# Patient Record
Sex: FEMALE | Race: WHITE | NOT HISPANIC OR LATINO | Employment: UNEMPLOYED | URBAN - METROPOLITAN AREA
[De-identification: names, ages, dates, MRNs, and addresses within clinical notes are randomized per-mention and may not be internally consistent; named-entity substitution may affect disease eponyms.]

---

## 2017-03-24 ENCOUNTER — CLINICAL SUPPORT (OUTPATIENT)
Dept: SMOKING CESSATION | Facility: CLINIC | Age: 57
End: 2017-03-24
Payer: COMMERCIAL

## 2017-03-24 DIAGNOSIS — F17.200 NICOTINE DEPENDENCE: Primary | ICD-10-CM

## 2017-03-24 PROCEDURE — 99407 BEHAV CHNG SMOKING > 10 MIN: CPT | Mod: S$GLB,,,

## 2017-09-11 ENCOUNTER — CLINICAL SUPPORT (OUTPATIENT)
Dept: SMOKING CESSATION | Facility: CLINIC | Age: 57
End: 2017-09-11
Payer: COMMERCIAL

## 2017-09-11 DIAGNOSIS — F17.200 NICOTINE DEPENDENCE: Primary | ICD-10-CM

## 2017-09-11 PROCEDURE — 99407 BEHAV CHNG SMOKING > 10 MIN: CPT | Mod: S$GLB,,, | Performed by: INTERNAL MEDICINE

## 2018-02-07 ENCOUNTER — TELEPHONE (OUTPATIENT)
Dept: PHARMACY | Facility: CLINIC | Age: 58
End: 2018-02-07

## 2018-02-07 RX ORDER — ESCITALOPRAM OXALATE 20 MG/1
20 TABLET ORAL NIGHTLY
Qty: 90 TABLET | Refills: 3 | Status: SHIPPED | OUTPATIENT
Start: 2018-02-07 | End: 2018-07-10

## 2018-02-07 NOTE — TELEPHONE ENCOUNTER
Call and relay:  I have sent in a 90 day prescription with refills for escitalopram (Lexapro) 20 mg to be taken every bedtime.

## 2018-02-07 NOTE — TELEPHONE ENCOUNTER
Called and spoke with patient notifying her that I was submitting a PA on her generic Lexapro 10mg to her insurance, because of the quantity.    She was aware that this was going to be needed.    I told her that hopefully it would be approved today, and I would get back with her asap.    PT verbalized understanding.    Please let me know if you any questions.     Thanks,   Nakia Ott  Patient Care Advocate   [facility]  Phone: [phone]  Fax: [phone]

## 2018-02-07 NOTE — TELEPHONE ENCOUNTER
Called and spoke with Ms. Alfaro.    INSURANCE WILL NOT ALLOW PA ON ESCITALOPRAM 10MG FOR 1.5 TABLETS #45 per 30 Day or #135 per 90 days. 2018 PLAN EXCLUDES PA ON THIS OR EXCEPTION OVERRIDE. ONLY ALLOWS QTY OF #30 PER 30 DAYS, OR #90 PER 90 DAYS. PER ALINA @ [facility] [phone] @ 11:25AM 2/7/18.      Even tried to submit claim via Cover My Meds and was unable to send due to drug/quantity not covered.    Explained I would let providers office know that PA could not be completed and that provider would have to consider maybe going up a dose to the 20MG or possible different drug.    Please call new prescription into [facility] (Store [id number]) @ [phone] (ph) or [phone] (fax) listed on patient's profile, she stated this is more convenient for her.    Patient was unsure why Rx called in by Dr. Sheehan's office as she sees Dr. Brooks.  I told her I would CC both of them on the message.    Pt will follow-up with MD on Thursday.    Please let me know if you any questions.     Thanks,   Nakia Ott  Patient Care Advocate   [facility]  Phone: [phone]  Fax: [phone]

## 2021-05-10 ENCOUNTER — PATIENT MESSAGE (OUTPATIENT)
Dept: RESEARCH | Facility: HOSPITAL | Age: 61
End: 2021-05-10

## 2022-01-07 ENCOUNTER — TELEPHONE (OUTPATIENT)
Dept: HEMATOLOGY/ONCOLOGY | Facility: CLINIC | Age: 62
End: 2022-01-07

## 2022-01-07 NOTE — NURSING
Received referral for [facility] from Dr. Sheehan. Appt scheduled for 1/13/21. Message sent to Dr. Gonzalez to determine need for PET scan. SPoke with patient regarding appt and directions to cancer center provided. She verbalized understanding    Oncology Navigation   Intake  Cancer Type: LDCT  Internal / External Referral: Internal  Referral Source: Epic referral from Dr. Sheehan  Date of Referral: 1/6/2022  Initial Nurse Navigator Contact: 1/7/2022  Referral to Initial Contact Timeline (days): 1  Date Worked: 1/7/2022     Treatment                              Acuity      Follow Up  No follow-ups on file.

## 2022-01-12 ENCOUNTER — TELEPHONE (OUTPATIENT)
Dept: HEMATOLOGY/ONCOLOGY | Facility: CLINIC | Age: 62
End: 2022-01-12
Payer: COMMERCIAL

## 2022-01-13 ENCOUNTER — OFFICE VISIT (OUTPATIENT)
Dept: PULMONOLOGY | Facility: CLINIC | Age: 62
End: 2022-01-13
Payer: COMMERCIAL

## 2022-01-13 ENCOUNTER — TELEPHONE (OUTPATIENT)
Dept: HEMATOLOGY/ONCOLOGY | Facility: CLINIC | Age: 62
End: 2022-01-13
Payer: COMMERCIAL

## 2022-01-13 VITALS
HEIGHT: 64 IN | RESPIRATION RATE: 17 BRPM | OXYGEN SATURATION: 98 % | TEMPERATURE: 97 F | WEIGHT: 196.44 LBS | DIASTOLIC BLOOD PRESSURE: 89 MMHG | HEART RATE: 87 BPM | BODY MASS INDEX: 33.54 KG/M2 | SYSTOLIC BLOOD PRESSURE: 134 MMHG

## 2022-01-13 DIAGNOSIS — J44.89 ASTHMA-COPD OVERLAP SYNDROME: ICD-10-CM

## 2022-01-13 DIAGNOSIS — R91.8 LUNG NODULES: ICD-10-CM

## 2022-01-13 DIAGNOSIS — R91.8 ABNORMAL CT LUNG SCREENING: ICD-10-CM

## 2022-01-13 PROCEDURE — 1159F MED LIST DOCD IN RCRD: CPT | Mod: CPTII,S$GLB,, | Performed by: INTERNAL MEDICINE

## 2022-01-13 PROCEDURE — 99204 OFFICE O/P NEW MOD 45 MIN: CPT | Mod: S$GLB,,, | Performed by: INTERNAL MEDICINE

## 2022-01-13 PROCEDURE — 99999 PR PBB SHADOW E&M-EST. PATIENT-LVL IV: ICD-10-PCS | Mod: PBBFAC,,, | Performed by: INTERNAL MEDICINE

## 2022-01-13 PROCEDURE — 3008F PR BODY MASS INDEX (BMI) DOCUMENTED: ICD-10-PCS | Mod: CPTII,S$GLB,, | Performed by: INTERNAL MEDICINE

## 2022-01-13 PROCEDURE — 3075F PR MOST RECENT SYSTOLIC BLOOD PRESS GE 130-139MM HG: ICD-10-PCS | Mod: CPTII,S$GLB,, | Performed by: INTERNAL MEDICINE

## 2022-01-13 PROCEDURE — 99204 PR OFFICE/OUTPT VISIT, NEW, LEVL IV, 45-59 MIN: ICD-10-PCS | Mod: S$GLB,,, | Performed by: INTERNAL MEDICINE

## 2022-01-13 PROCEDURE — 1160F RVW MEDS BY RX/DR IN RCRD: CPT | Mod: CPTII,S$GLB,, | Performed by: INTERNAL MEDICINE

## 2022-01-13 PROCEDURE — 3079F PR MOST RECENT DIASTOLIC BLOOD PRESSURE 80-89 MM HG: ICD-10-PCS | Mod: CPTII,S$GLB,, | Performed by: INTERNAL MEDICINE

## 2022-01-13 PROCEDURE — 1160F PR REVIEW ALL MEDS BY PRESCRIBER/CLIN PHARMACIST DOCUMENTED: ICD-10-PCS | Mod: CPTII,S$GLB,, | Performed by: INTERNAL MEDICINE

## 2022-01-13 PROCEDURE — 99999 PR PBB SHADOW E&M-EST. PATIENT-LVL IV: CPT | Mod: PBBFAC,,, | Performed by: INTERNAL MEDICINE

## 2022-01-13 PROCEDURE — 3079F DIAST BP 80-89 MM HG: CPT | Mod: CPTII,S$GLB,, | Performed by: INTERNAL MEDICINE

## 2022-01-13 PROCEDURE — 3075F SYST BP GE 130 - 139MM HG: CPT | Mod: CPTII,S$GLB,, | Performed by: INTERNAL MEDICINE

## 2022-01-13 PROCEDURE — 3008F BODY MASS INDEX DOCD: CPT | Mod: CPTII,S$GLB,, | Performed by: INTERNAL MEDICINE

## 2022-01-13 PROCEDURE — 1159F PR MEDICATION LIST DOCUMENTED IN MEDICAL RECORD: ICD-10-PCS | Mod: CPTII,S$GLB,, | Performed by: INTERNAL MEDICINE

## 2022-01-13 NOTE — PROGRESS NOTES
[facility]   Initial Office Visit  Chief Complaint   Patient presents with    [facility]     Referred by Dr. Sheehan       SUBJECTIVE:     History of Present Illness:  Patient is a 61 y.o. female presents for evaluation. Pulmonary evaluation for abnormal lung cancer screening referred by Dr Sheehan.    Patient reports chronic cough sputum production.  Sputum is clear    Quit smoking 2016 after 43 years 1 and half packs per day.  Reports that she was tickling when she was ill.  There is a history of eczema in her family.  Patient did have pulmonary function studies requested by Dr. Sheehan that showed moderate airflow obstruction with good response to bronchodilator and then a moderate decrease in the DLCO.  Patient does not have an exercise routine.  [facility] and she was prescribed Symbicort that she has started to use.  She never feels the need to use a rescue inhaler    She is wondering whether she really needs to have a bronchodilator on a chronic basis    Lung cancer screening CT was obtained and showed some abnormalities and that is the reason she is here today  Patient denies chest pain hemoptysis.  She reports occasional back pain.    Exposures: 1.5 PPD x 43 years ; quit in 2016    Smoking Hx:  As above    Past Medical History:   Diagnosis Date    a Palpitations     7/10/18 Referred To Dr. Rojas Streeter; 7/10/18 EKG = (See Report)    c Hypercholesterolemia     8/29/18 RXd Lipitor 20 Mg qHS; 5/28/17 RXd Lifestyle Changes    e H/O Hypothyroidism     She Was On Levothyroxine For This In The 1990s    e Left Subcentimeter Stable Thyroid Nodules     Will Repeat A Thyroid U/S In 2 Years; 5/18/17 Thyroid U/S = (See Report); This Was BXd In 1990 And Was Benign    g Chronic Mild Thrombocytosis     Am Monitoring    i Drowsiness With Snoring     9/28/17 Referred To Dr. Kimber Arevalo    i H/O 1.5 PPD X 43 YRs TUD, Quit In 09/2016     i PPD POS CXR Neg TXd  "In  With INH For 6 Months     j Chronic Diarrhea     17 RXd Flagyl X 10 Days    j Family H/O Colon Polyps     Dr. Farhad mar GERD     Dr. Farhad mar H/O Adenomatous Colon Polyps On 17 TC     Dr. Farhad Marcos: "Repeat TC In 5 YRs"    j Sigmoid Diverticulosis     Dr. Farhad mar Small Internal Hemorrhoids     Dr. Farhad herrera Bilateral Fibrocystic Breast Changes     k H/O Bladder Suspension Sx In      n Depression And Anxiety     10/23/18 I Gave Her A List Of Therapists; 7/10/18 RXd Zoloft 50 Mg qHS But She Never Took It; 17 Increased Lexapro To 15 Mg qHS But This Caused Drowsiness; 17 RXd Lexapro 10 Mg qHS    o Allergic Rhinosinusitis     q Vitamin D deficiency     18 RXd OTC D3 5K IU Daily    Wellness Visit 2017      Past Surgical History:   Procedure Laterality Date    COLONOSCOPY  2017    HYSTERECTOMY      TUBAL LIGATION      Tummy tuck       Family History   Problem Relation Age of Onset    Lung disease Mother     Lung disease Father     Breast cancer Maternal Aunt      Social History     Tobacco Use    Smoking status: Former Smoker     Packs/day: 1.50     Years: 43.00     Pack years: 64.50     Types: Cigarettes     Quit date: 9/15/2016     Years since quittin.3    Smokeless tobacco: Former User    Tobacco comment: 16 Decreased cigarette smoking from 2 PPD to 11 cigarettes per day.   Substance Use Topics    Alcohol use: No    Drug use: No        Review of patient's allergies indicates:   Allergen Reactions    Penicillins Nausea And Vomiting and Rash    Tylenol [acetaminophen] Swelling       Current Outpatient Medications on File Prior to Visit   Medication Sig Dispense Refill    atorvastatin (LIPITOR) 10 MG tablet Take 1 tablet (10 mg total) by mouth once daily. 90 tablet 3    AVASTIN 25 mg/mL injection INJECT 0.05ML TO AFFECTED EYE(S) AS DIRECTED      budesonide-formoterol 160-4.5 " "mcg (SYMBICORT) 160-4.5 mcg/actuation HFAA Inhale 2 puffs into the lungs 2 (two) times daily. Controller 10.2 g 6    cholecalciferol, vitamin D3, 5,000 unit Tab Take 5,000 Units by mouth once daily.      brompheniramine-pseudoeph-DM (BROMFED DM) 2-30-10 mg/5 mL Syrp Take 10 mLs by mouth 4 (four) times daily as needed. 240 mL 0     No current facility-administered medications on file prior to visit.         Review of Systems     Constitutional: Negative unless otherwise commented above  HENT: Negative unless otherwise commented above  Eyes: Negative unless otherwise commented above  Respiratory: Negative unless otherwise commented above  Cardiovascular: Negative unless otherwise commented above  Musculoskeletal: Negative unless otherwise commented above  Skin: Negative unless otherwise commented above  Neurological: Negative unless otherwise commented above  Hematological: Negative unless otherwise commented above  Endocrine: Negative unless otherwise commented above    OBJECTIVE:     Vital Signs (Most Recent)  Visit Vitals  /89 (BP Location: Left arm, Patient Position: Sitting)   Pulse 87   Temp 97.4 °F (36.3 °C) (Temporal)   Resp 17   Ht 5' 4" (1.626 m)   Wt 89.1 kg (196 lb 6.9 oz)   SpO2 98%   BMI 33.72 kg/m²     5' 4" (1.626 m)  89.1 kg (196 lb 6.9 oz)     Physical Exam:    General:  BMI 33 awake alert.  Reports some small nodules in the extremities 1 the right lumbar area collected on small possibly lipomas   Eye: Extraocular movements are intact, Normal conjunctiva.  No scleral icterus  HENT: Normocephalic, Oral mucosa is moist, No pharyngeal erythema, clear oropharynx  Neck: Supple, Non-tender, No jugular venous distention.  Respiratory:  Diminished breath sounds but no adventitious sounds   Cardiovascular:  Regular rate and rhythm no murmur   Extremities:  No cyanosis clubbing or edema  Gastrointestinal: Soft, Non-tender, Non-distended   Lymphatics: No lymphadenopathy neck, supraclavicular. "   Integumentary: Warm, Dry.   Neurologic: Alert, Oriented, Normal motor function, No focal defects.       Diagnostic Results:    PFT Results  Spirometry  FVC Liters 3.03 2.51 83 2.76 91 10  FEV1 Liters 2.45 1.48 60 1.74 71 18% response  DLCO mL/min/mmHg 29.3 16.8 57  No data recorded   No data recorded  No data recorded  No data recorded  No data recorded  No data recorded  No data recorded  No data recorded  No data recorded  No data recorded  No data recorded  SpO2: 98 % (room air)      Significant Imaging:  CXR: Ct chest lung cancer screening with 2 RUL and NEY noduiles 3 mm or so. No mediastinal LN enlargement  RLL smooth paravertebral nodule 9 mm diam abuts the spine     Test(s) Reviewed  I have personally reviewed the following in detail:  [] Chest Xray Images   [x] CT Images   [] Echocardiogram   [] Labs   [x] Other:  pft     Assessment:         ICD-10-CM ICD-9-CM   1. Abnormal CT lung screening  R91.8 793.19   2. Asthma-COPD overlap syndrome  J44.9 493.20   3. Lung nodules  R91.8 793.19   4. BMI 33.0-33.9,adult  Z68.33 V85.33        Plan:   Abnormal CT lung screening  -     Ambulatory referral/consult to [facility]    Asthma-COPD overlap syndrome    Lung nodules  -     CT Chest Low Dose Diagnostic; Future; Expected date: 07/13/2022    BMI 33.0-33.9,adult       Lung nodule upper zones too small for any intervention at presenty.  RLL medial pleural based nodule is smooth. ARNIE risk calculator gave me 7% risk of malignancy. Will discuss with Radiology whether any concern for neural tumor and if MRI is indicated  Pt had Prevnar in 2017 but not flu or Covid vaccines. Definetely recommeded those    No follow-ups on file.     See labs and med orders.    Medications have been reviewed and reconciled.      Portions of this note may have been created with voice recognition software.  Grammatical, syntax and spelling errors may be inevitable.

## 2022-01-13 NOTE — NURSING
Patient seen by Dr. Gonzalez in Lung Clinic today. After Tumor Conference it was determined to repeat LDCT in 3 months and follow up with [facility]. Hold off on MRI for now. Notified [facility] for future follow up. Patient notified and I provided her with my contact number. Advised to contact the office with any questions or concerns.    Oncology Navigation   Intake  Cancer Type: LDCT  Internal / External Referral: Internal  Referral Source: Epic referral from Dr. Sheehan  Date of Referral: 1/6/2022  Initial Nurse Navigator Contact: 1/7/2022  Referral to Initial Contact Timeline (days): 1  Date Worked: 1/13/2022     Treatment              Procedures: CT  CT Schedule Date:  (To be scheduled by [facility] prior to follow up)                 Acuity      Follow Up  No follow-ups on file.

## 2022-08-25 ENCOUNTER — HOSPITAL ENCOUNTER (OUTPATIENT)
Dept: RADIOLOGY | Facility: HOSPITAL | Age: 62
Discharge: HOME | End: 2022-08-25
Attending: INTERNAL MEDICINE
Payer: COMMERCIAL

## 2022-08-25 DIAGNOSIS — R91.1 LUNG NODULE: ICD-10-CM

## 2022-08-25 LAB — GLUCOSE SERPL-MCNC: 82 MG/DL (ref 70–110)

## 2022-08-25 PROCEDURE — 78815 PET IMAGE W/CT SKULL-THIGH: CPT | Mod: 26,PI,, | Performed by: RADIOLOGY

## 2022-08-25 PROCEDURE — 78815 NM PET CT ROUTINE: ICD-10-PCS | Mod: 26,PI,, | Performed by: RADIOLOGY

## 2022-08-25 PROCEDURE — 78815 PET IMAGE W/CT SKULL-THIGH: CPT | Mod: TC,PN

## 2022-08-25 NOTE — PROGRESS NOTES
PET Imaging Questionnaire    1. Are you a Diabetic? Recent Blood Sugar level? No    2. Are you anemic? Bone Marrow Stimulation Meds? No    3. Have you had a CT Scan, if so when & where was your last one? Yes -     4. Have you had a PET Scan, if so when & where was your last one? No    5. Chemotherapy or currently on Chemotherapy? No    6. Radiation therapy? No    Surgical History:   Past Surgical History:   Procedure Laterality Date    COLONOSCOPY  06/08/2017    HYSTERECTOMY      TUBAL LIGATION      Tummy tuck  2009   7.      8. Have you been fasting for at least 6 hours? Yes    9. Is there any chance you may be pregnant or breastfeeding? No    Assay: 12.11 MCi@:9.51   Injection Site:rt ac     Residual: 1.01 mCi@: 9.53   Technologist: Willa Rai Injected:11.1mCi

## 2022-08-30 PROBLEM — E83.52 HYPERCALCEMIA: Status: ACTIVE | Noted: 2022-08-30

## 2022-08-30 PROBLEM — R91.1 LUNG NODULE: Status: ACTIVE | Noted: 2022-01-13

## 2022-11-04 DIAGNOSIS — R52 PAIN: Primary | ICD-10-CM

## 2022-11-07 ENCOUNTER — OFFICE VISIT (OUTPATIENT)
Dept: ORTHOPEDICS | Facility: CLINIC | Age: 62
End: 2022-11-07
Payer: COMMERCIAL

## 2022-11-07 ENCOUNTER — HOSPITAL ENCOUNTER (OUTPATIENT)
Dept: RADIOLOGY | Facility: HOSPITAL | Age: 62
Discharge: HOME | End: 2022-11-07
Attending: ORTHOPAEDIC SURGERY
Payer: COMMERCIAL

## 2022-11-07 VITALS — HEIGHT: 64 IN | WEIGHT: 201.81 LBS | BODY MASS INDEX: 34.45 KG/M2

## 2022-11-07 DIAGNOSIS — M24.852 LEFT SNAPPING HIP: Primary | ICD-10-CM

## 2022-11-07 DIAGNOSIS — R52 PAIN: ICD-10-CM

## 2022-11-07 PROCEDURE — 73502 X-RAY EXAM HIP UNI 2-3 VIEWS: CPT | Mod: TC,LT

## 2022-11-07 PROCEDURE — 3008F BODY MASS INDEX DOCD: CPT | Mod: CPTII,S$GLB,, | Performed by: ORTHOPAEDIC SURGERY

## 2022-11-07 PROCEDURE — 99999 PR PBB SHADOW E&M-EST. PATIENT-LVL III: CPT | Mod: PBBFAC,,, | Performed by: ORTHOPAEDIC SURGERY

## 2022-11-07 PROCEDURE — 1159F PR MEDICATION LIST DOCUMENTED IN MEDICAL RECORD: ICD-10-PCS | Mod: CPTII,S$GLB,, | Performed by: ORTHOPAEDIC SURGERY

## 2022-11-07 PROCEDURE — 73502 XR HIP WITH PELVIS WHEN PERFORMED, 2 OR 3 VIEWS LEFT: ICD-10-PCS | Mod: 26,LT,, | Performed by: RADIOLOGY

## 2022-11-07 PROCEDURE — 3008F PR BODY MASS INDEX (BMI) DOCUMENTED: ICD-10-PCS | Mod: CPTII,S$GLB,, | Performed by: ORTHOPAEDIC SURGERY

## 2022-11-07 PROCEDURE — 73502 X-RAY EXAM HIP UNI 2-3 VIEWS: CPT | Mod: 26,LT,, | Performed by: RADIOLOGY

## 2022-11-07 PROCEDURE — 99999 PR PBB SHADOW E&M-EST. PATIENT-LVL III: ICD-10-PCS | Mod: PBBFAC,,, | Performed by: ORTHOPAEDIC SURGERY

## 2022-11-07 PROCEDURE — 1160F RVW MEDS BY RX/DR IN RCRD: CPT | Mod: CPTII,S$GLB,, | Performed by: ORTHOPAEDIC SURGERY

## 2022-11-07 PROCEDURE — 99203 PR OFFICE/OUTPT VISIT, NEW, LEVL III, 30-44 MIN: ICD-10-PCS | Mod: S$GLB,,, | Performed by: ORTHOPAEDIC SURGERY

## 2022-11-07 PROCEDURE — 99203 OFFICE O/P NEW LOW 30 MIN: CPT | Mod: S$GLB,,, | Performed by: ORTHOPAEDIC SURGERY

## 2022-11-07 PROCEDURE — 1159F MED LIST DOCD IN RCRD: CPT | Mod: CPTII,S$GLB,, | Performed by: ORTHOPAEDIC SURGERY

## 2022-11-07 PROCEDURE — 1160F PR REVIEW ALL MEDS BY PRESCRIBER/CLIN PHARMACIST DOCUMENTED: ICD-10-PCS | Mod: CPTII,S$GLB,, | Performed by: ORTHOPAEDIC SURGERY

## 2022-11-07 NOTE — PROGRESS NOTES
"Subjective:      Patient ID: Angelina Calvin is a 61 y.o. female.    Chief Complaint: Pain of the Left Hip    HPI  Angelina Calvin is a 61 year old female here with a 2 month history of left hip clicking. The patient is a  homemaker. There was not a history of trauma.  No pain.  No aggravating, alleviating or associated factors.  She does not have difficulty getting in or out of a car, getting dressed, or going up or down stairs.  The patient does not use an assistive device.    Past Medical History:   Diagnosis Date    a Palpitations     7/10/18 Referred To Dr. Rojas Streeter; 7/10/18 EKG = (See Report)    c Hypercholesterolemia     8/29/18 RXd Lipitor 20 Mg qHS; 5/28/17 RXd Lifestyle Changes    e H/O Hypothyroidism     She Was On Levothyroxine For This In The 1990s    e Left Subcentimeter Stable Thyroid Nodules     Will Repeat A Thyroid U/S In 2 Years; 5/18/17 Thyroid U/S = (See Report); This Was BXd In 1990 And Was Benign    g Chronic Mild Thrombocytosis     Am Monitoring    i Drowsiness With Snoring     9/28/17 Referred To Dr. Kimber Arevalo    i H/O 1.5 PPD X 43 YRs TUD, Quit In 09/2016     i PPD POS CXR Neg TXd In 1993 With INH For 6 Months     j Chronic Diarrhea     11/16/17 RXd Flagyl X 10 Days    j Family H/O Colon Polyps     Dr. Farhad mar GERD     Dr. Farhad mar H/O Adenomatous Colon Polyps On 6/8/17 TC     Dr. Farhad Marcos: "Repeat TC In 5 YRs"    j Sigmoid Diverticulosis     Dr. Farhad Marcos    j Small Internal Hemorrhoids     Dr. Farhad Marcos    k Bilateral Fibrocystic Breast Changes     k H/O Bladder Suspension Sx In 2008     n Depression And Anxiety     10/23/18 I Gave Her A List Of Therapists; 7/10/18 RXd Zoloft 50 Mg qHS But She Never Took It; 11/16/17 Increased Lexapro To 15 Mg qHS But This Caused Drowsiness; 9/28/17 RXd Lexapro 10 Mg qHS    o Allergic Rhinosinusitis     q Vitamin D deficiency     8/29/18 RXd OTC D3 5K IU Daily    Wellness Visit 5/5/2017      Past Surgical " History:   Procedure Laterality Date    COLONOSCOPY  2017    HYSTERECTOMY      TUBAL LIGATION      Tummy tuck       Family History   Problem Relation Age of Onset    Lung disease Mother     Lung disease Father     Breast cancer Maternal Aunt      Social History     Socioeconomic History    Marital status:      Spouse name: Christofer    Number of children: 3   Occupational History    Occupation: Retired   Tobacco Use    Smoking status: Former     Packs/day: 1.50     Years: 43.00     Pack years: 64.50     Types: Cigarettes     Quit date: 9/15/2016     Years since quittin.1    Smokeless tobacco: Former    Tobacco comments:     16 Decreased cigarette smoking from 2 PPD to 11 cigarettes per day.   Substance and Sexual Activity    Alcohol use: No    Drug use: No    Sexual activity: Yes     Partners: Male     Current Outpatient Medications on File Prior to Visit   Medication Sig Dispense Refill    albuterol (VENTOLIN HFA) 90 mcg/actuation inhaler Inhale 2 puffs into the lungs every 6 (six) hours as needed for Wheezing. Rescue 18 g 4    AVASTIN 25 mg/mL injection INJECT 0.05ML TO AFFECTED EYE(S) AS DIRECTED      budesonide-formoterol 80-4.5 mcg (SYMBICORT) 80-4.5 mcg/actuation HFAA Inhale 2 puffs into the lungs 2 (two) times a day. 10.2 g 6    cholecalciferol, vitamin D3, 5,000 unit Tab Take 5,000 Units by mouth once daily.      FLUoxetine 20 MG capsule Take 20 mg by mouth once daily.      rosuvastatin (CRESTOR) 10 MG tablet Take 1 tablet (10 mg total) by mouth once daily. 90 tablet 3     No current facility-administered medications on file prior to visit.     Review of patient's allergies indicates:   Allergen Reactions    Penicillins Nausea And Vomiting and Rash    Tylenol [acetaminophen] Swelling       Review of Systems   Constitutional: Negative for chills, fever and night sweats.   HENT:  Negative for hearing loss.    Eyes:  Negative for blurred vision and double vision.   Cardiovascular:   "Negative for chest pain, claudication and leg swelling.   Respiratory:  Negative for shortness of breath.    Endocrine: Negative for polydipsia, polyphagia and polyuria.   Hematologic/Lymphatic: Negative for adenopathy and bleeding problem. Does not bruise/bleed easily.   Skin:  Negative for poor wound healing.   Gastrointestinal:  Negative for diarrhea and heartburn.   Genitourinary:  Negative for bladder incontinence.   Neurological:  Negative for focal weakness, headaches, numbness, paresthesias and sensory change.   Psychiatric/Behavioral:  The patient is not nervous/anxious.    Allergic/Immunologic: Negative for persistent infections.       Objective:      Body mass index is 34.64 kg/m².  Vitals:    11/07/22 1045   Weight: 91.6 kg (201 lb 13.3 oz)   Height: 5' 4" (1.626 m)         General    Constitutional: She is oriented to person, place, and time. She appears well-developed and well-nourished.   HENT:   Head: Normocephalic and atraumatic.   Eyes: EOM are normal.   Cardiovascular:  Normal rate.            Pulmonary/Chest: Effort normal.   Neurological: She is alert and oriented to person, place, and time.   Psychiatric: She has a normal mood and affect. Her behavior is normal.     General Musculoskeletal Exam   Gait: normal   Pelvic Obliquity: none      Right Knee Exam     Inspection   Alignment:  normal  Effusion: absent    Left Knee Exam     Inspection   Alignment:  normal  Effusion: absent    Right Hip Exam     Inspection   Scars: absent  Swelling: absent  Bruising: absent  No deformity of hip.  Quadriceps Atrophy:  Negative  Erythema: absent    Range of Motion   Abduction:  25   Adduction:  20   Extension:  0   Flexion:  100   External rotation:  30   Internal rotation:  25     Tests   Pain w/ forced internal rotation (BARON): absent  Stinchfield test: negative    Other   Sensation: normal  Left Hip Exam     Inspection   Scars: absent  Swelling: absent  No deformity of hip.  Quadriceps Atrophy:  " negative  Erythema: absent  Bruising: absent    Range of Motion   Abduction:  25   Adduction:  20   Extension:  0   Flexion:  100   External rotation:  30   Internal rotation: 25     Tests   Pain w/ forced internal rotation (BARON): absent  Stinchfield test: negative    Other   Sensation: normal      Back (L-Spine & T-Spine) / Neck (C-Spine) Exam   Back exam is normal.      Muscle Strength   Right Lower Extremity   Hip Abduction: 5/5   Hip Adduction: 5/5   Hip Flexion: 5/5   Ankle Dorsiflexion:  5/5   Left Lower Extremity   Hip Abduction: 5/5   Hip Adduction: 5/5   Hip Flexion: 5/5   Ankle Dorsiflexion:  5/5     Reflexes     Left Side  Quadriceps:  2+    Right Side   Quadriceps:  2+    Vascular Exam     Right Pulses  Dorsalis Pedis:      2+          Left Pulses  Dorsalis Pedis:      2+          Capillary Refill  Right Hand: normal capillary refill  Left Hand: normal capillary refill        Edema  Right Upper Leg: absent  Left Upper Leg: absent    Radiographs taken today and reviewed by me demonstrate minimal arthritic change of the bilateral hip(s).There  is not bone destruction.  There is not a fracture.          Assessment:       Encounter Diagnosis   Name Primary?    Left snapping hip Yes          Plan:       Angelina was seen today for pain.    Diagnoses and all orders for this visit:    Left snapping hip      F/U in 6 weeks.  Will get MRI if sx persist or worsen.  She can take OTC NSAIDS

## 2023-06-14 PROBLEM — I20.9 AP (ANGINA PECTORIS): Status: ACTIVE | Noted: 2023-06-14

## 2024-03-20 PROBLEM — F17.201 TOBACCO ABUSE, IN REMISSION: Status: ACTIVE | Noted: 2024-03-20
